# Patient Record
Sex: FEMALE | Race: WHITE | NOT HISPANIC OR LATINO | Employment: OTHER | URBAN - METROPOLITAN AREA
[De-identification: names, ages, dates, MRNs, and addresses within clinical notes are randomized per-mention and may not be internally consistent; named-entity substitution may affect disease eponyms.]

---

## 2023-09-07 ENCOUNTER — HOSPITAL ENCOUNTER (OUTPATIENT)
Dept: DATA CONVERSION | Facility: HOSPITAL | Age: 68
Discharge: HOME | End: 2023-09-07
Payer: MEDICARE

## 2023-09-07 DIAGNOSIS — Z00.00 ENCOUNTER FOR GENERAL ADULT MEDICAL EXAMINATION WITHOUT ABNORMAL FINDINGS: ICD-10-CM

## 2023-09-07 DIAGNOSIS — E55.9 VITAMIN D DEFICIENCY, UNSPECIFIED: ICD-10-CM

## 2023-09-07 DIAGNOSIS — E78.5 HYPERLIPIDEMIA, UNSPECIFIED: ICD-10-CM

## 2023-09-07 LAB
25(OH)D3 SERPL-MCNC: 47 NG/ML (ref 31–100)
ALBUMIN SERPL-MCNC: 4.8 GM/DL (ref 3.5–5)
ALBUMIN/GLOB SERPL: 1.8 RATIO (ref 1.5–3)
ALP BLD-CCNC: 66 U/L (ref 35–125)
ALT SERPL-CCNC: 22 U/L (ref 5–40)
ANION GAP SERPL CALCULATED.3IONS-SCNC: 13 MMOL/L (ref 0–19)
APPEARANCE PLAS: ABNORMAL
AST SERPL-CCNC: 27 U/L (ref 5–40)
BASOPHILS # BLD AUTO: 0.04 K/UL (ref 0–0.22)
BASOPHILS NFR BLD AUTO: 0.7 % (ref 0–1)
BILIRUB SERPL-MCNC: 0.6 MG/DL (ref 0.1–1.2)
BUN SERPL-MCNC: 16 MG/DL (ref 8–25)
BUN/CREAT SERPL: 26.7 RATIO (ref 8–21)
CALCIUM SERPL-MCNC: 9.7 MG/DL (ref 8.5–10.4)
CHLORIDE SERPL-SCNC: 103 MMOL/L (ref 97–107)
CHOLEST SERPL-MCNC: 227 MG/DL (ref 133–200)
CHOLEST/HDLC SERPL: 3.2 RATIO
CO2 SERPL-SCNC: 27 MMOL/L (ref 24–31)
COLOR SPUN FLD: YELLOW
CREAT SERPL-MCNC: 0.6 MG/DL (ref 0.4–1.6)
DEPRECATED RDW RBC AUTO: 44.7 FL (ref 37–54)
DIFFERENTIAL METHOD BLD: ABNORMAL
EOSINOPHIL # BLD AUTO: 0.02 K/UL (ref 0–0.45)
EOSINOPHIL NFR BLD: 0.3 % (ref 0–3)
ERYTHROCYTE [DISTWIDTH] IN BLOOD BY AUTOMATED COUNT: 12.3 % (ref 11.7–15)
FASTING STATUS PATIENT QL REPORTED: ABNORMAL
GFR SERPL CREATININE-BSD FRML MDRD: 98 ML/MIN/1.73 M2
GLOBULIN SER-MCNC: 2.6 G/DL (ref 1.9–3.7)
GLUCOSE SERPL-MCNC: 99 MG/DL (ref 65–99)
HCT VFR BLD AUTO: 42.4 % (ref 36–44)
HDLC SERPL-MCNC: 72 MG/DL
HGB BLD-MCNC: 14 GM/DL (ref 12–15)
IMM GRANULOCYTES # BLD AUTO: 0.02 K/UL (ref 0–0.1)
LDLC SERPL CALC-MCNC: 127 MG/DL (ref 65–130)
LYMPHOCYTES # BLD AUTO: 1.86 K/UL (ref 1.2–3.2)
LYMPHOCYTES NFR BLD MANUAL: 31.7 % (ref 20–40)
MCH RBC QN AUTO: 32.3 PG (ref 26–34)
MCHC RBC AUTO-ENTMCNC: 33 % (ref 31–37)
MCV RBC AUTO: 97.9 FL (ref 80–100)
MONOCYTES # BLD AUTO: 0.62 K/UL (ref 0–0.8)
MONOCYTES NFR BLD MANUAL: 10.6 % (ref 0–8)
NEUTROPHILS # BLD AUTO: 3.3 K/UL
NEUTROPHILS # BLD AUTO: 3.3 K/UL (ref 1.8–7.7)
NEUTROPHILS.IMMATURE NFR BLD: 0.3 % (ref 0–1)
NEUTS SEG NFR BLD: 56.4 % (ref 50–70)
NRBC BLD-RTO: 0 /100 WBC
PLATELET # BLD AUTO: 310 K/UL (ref 150–450)
PMV BLD AUTO: 10.3 CU (ref 7–12.6)
POTASSIUM SERPL-SCNC: 4.5 MMOL/L (ref 3.4–5.1)
PROT SERPL-MCNC: 7.4 G/DL (ref 5.9–7.9)
RBC # BLD AUTO: 4.33 M/UL (ref 4–4.9)
SODIUM SERPL-SCNC: 143 MMOL/L (ref 133–145)
TRIGL SERPL-MCNC: 138 MG/DL (ref 40–150)
TSH SERPL DL<=0.05 MIU/L-ACNC: 1.1 MIU/L (ref 0.27–4.2)
WBC # BLD AUTO: 5.9 K/UL (ref 4.5–11)

## 2023-11-20 DIAGNOSIS — E78.5 HYPERLIPIDEMIA, UNSPECIFIED HYPERLIPIDEMIA TYPE: ICD-10-CM

## 2023-11-20 DIAGNOSIS — F41.9 ANXIETY: ICD-10-CM

## 2023-11-20 RX ORDER — CITALOPRAM 10 MG/1
10 TABLET ORAL DAILY
Qty: 90 TABLET | Refills: 3 | Status: SHIPPED | OUTPATIENT
Start: 2023-11-20

## 2023-11-20 RX ORDER — ATORVASTATIN CALCIUM 40 MG/1
TABLET, FILM COATED ORAL
COMMUNITY
End: 2024-02-13 | Stop reason: SDUPTHER

## 2023-11-20 RX ORDER — ATORVASTATIN CALCIUM 80 MG/1
TABLET, FILM COATED ORAL EVERY 24 HOURS
COMMUNITY
Start: 2018-10-22 | End: 2023-11-20 | Stop reason: SDUPTHER

## 2023-11-20 RX ORDER — ATORVASTATIN CALCIUM 80 MG/1
80 TABLET, FILM COATED ORAL EVERY 24 HOURS
Qty: 90 TABLET | Refills: 3 | Status: SHIPPED | OUTPATIENT
Start: 2023-11-20 | End: 2024-11-19

## 2023-12-26 DIAGNOSIS — R06.02 SHORTNESS OF BREATH: Primary | ICD-10-CM

## 2023-12-26 PROBLEM — M75.121 COMPLETE TEAR OF RIGHT ROTATOR CUFF: Status: ACTIVE | Noted: 2023-12-26

## 2023-12-26 PROBLEM — K21.9 GASTROESOPHAGEAL REFLUX DISEASE: Status: ACTIVE | Noted: 2023-12-26

## 2023-12-26 PROBLEM — M75.81 TENDINITIS OF RIGHT ROTATOR CUFF: Status: ACTIVE | Noted: 2023-12-26

## 2023-12-26 PROBLEM — M50.90 CERVICAL DISC DISORDER: Status: ACTIVE | Noted: 2023-12-26

## 2023-12-26 PROBLEM — H90.3 BILATERAL SENSORINEURAL HEARING LOSS: Status: ACTIVE | Noted: 2023-12-26

## 2023-12-26 PROBLEM — S46.011A STRAIN OF RIGHT ROTATOR CUFF CAPSULE: Status: ACTIVE | Noted: 2023-12-26

## 2023-12-26 PROBLEM — M19.011 OSTEOARTHRITIS OF RIGHT GLENOHUMERAL JOINT: Status: ACTIVE | Noted: 2023-12-26

## 2023-12-26 PROBLEM — F41.9 ANXIETY: Status: ACTIVE | Noted: 2023-12-26

## 2023-12-26 PROBLEM — M75.111 NONTRAUMATIC INCOMPLETE TEAR OF RIGHT ROTATOR CUFF: Status: ACTIVE | Noted: 2023-12-26

## 2023-12-26 PROBLEM — I20.9 ANGINA PECTORIS (CMS-HCC): Status: ACTIVE | Noted: 2023-12-26

## 2023-12-26 PROBLEM — E55.9 VITAMIN D DEFICIENCY: Status: ACTIVE | Noted: 2023-12-26

## 2023-12-26 PROBLEM — E78.00 PURE HYPERCHOLESTEROLEMIA: Status: ACTIVE | Noted: 2023-12-26

## 2023-12-26 PROBLEM — E78.5 HYPERLIPIDEMIA: Status: ACTIVE | Noted: 2023-12-26

## 2023-12-26 PROBLEM — R03.0 FINDING OF ABOVE NORMAL BLOOD PRESSURE: Status: ACTIVE | Noted: 2023-12-26

## 2023-12-26 PROBLEM — M75.42 IMPINGEMENT SYNDROME OF LEFT SHOULDER REGION: Status: ACTIVE | Noted: 2023-12-26

## 2023-12-26 PROBLEM — G56.21 CUBITAL TUNNEL SYNDROME ON RIGHT: Status: ACTIVE | Noted: 2023-12-26

## 2023-12-26 PROBLEM — M77.8 TENDINITIS OF LEFT SHOULDER: Status: ACTIVE | Noted: 2023-12-26

## 2023-12-26 PROBLEM — H93.13 BILATERAL TINNITUS: Status: ACTIVE | Noted: 2023-12-26

## 2023-12-26 PROBLEM — R32 INCONTINENCE: Status: ACTIVE | Noted: 2023-12-26

## 2023-12-26 PROBLEM — M65.911 SYNOVITIS OF RIGHT SHOULDER: Status: ACTIVE | Noted: 2023-12-26

## 2023-12-26 PROBLEM — S46.219A BICEPS TENDON TEAR: Status: ACTIVE | Noted: 2023-12-26

## 2023-12-26 PROBLEM — S46.919A SHOULDER STRAIN: Status: ACTIVE | Noted: 2023-12-26

## 2023-12-26 PROBLEM — M75.41 IMPINGEMENT SYNDROME OF RIGHT SHOULDER REGION: Status: ACTIVE | Noted: 2023-12-26

## 2023-12-26 PROBLEM — M75.51 BURSITIS OF RIGHT SHOULDER: Status: ACTIVE | Noted: 2023-12-26

## 2023-12-26 PROBLEM — M95.8 WINGED SCAPULA: Status: ACTIVE | Noted: 2023-12-26

## 2023-12-26 PROBLEM — M65.811 SYNOVITIS OF RIGHT SHOULDER: Status: ACTIVE | Noted: 2023-12-26

## 2023-12-26 PROBLEM — S43.499A SPRAIN OF POSTERIOR SHOULDER JOINT: Status: ACTIVE | Noted: 2023-12-26

## 2023-12-26 PROBLEM — F43.9 STRESS AT HOME: Status: ACTIVE | Noted: 2023-12-26

## 2023-12-26 PROBLEM — R42 DIZZINESSES: Status: ACTIVE | Noted: 2023-12-26

## 2023-12-26 PROBLEM — M25.511 PAIN IN JOINT OF RIGHT SHOULDER: Status: ACTIVE | Noted: 2023-12-26

## 2023-12-26 RX ORDER — NADOLOL 20 MG/1
20 TABLET ORAL 2 TIMES DAILY
Qty: 180 TABLET | Refills: 1 | Status: SHIPPED | OUTPATIENT
Start: 2023-12-26 | End: 2024-02-13 | Stop reason: SDUPTHER

## 2023-12-26 RX ORDER — CYCLOBENZAPRINE HCL 10 MG
10 TABLET ORAL 3 TIMES DAILY PRN
COMMUNITY
Start: 2023-03-22 | End: 2024-05-09 | Stop reason: ALTCHOICE

## 2023-12-26 RX ORDER — OXYCODONE AND ACETAMINOPHEN 5; 325 MG/1; MG/1
1 TABLET ORAL EVERY 6 HOURS PRN
COMMUNITY
Start: 2019-09-20

## 2023-12-26 RX ORDER — DICLOFENAC SODIUM 10 MG/G
4 GEL TOPICAL 4 TIMES DAILY PRN
COMMUNITY
Start: 2023-03-22 | End: 2024-05-09 | Stop reason: ALTCHOICE

## 2023-12-26 RX ORDER — NITROGLYCERIN 0.4 MG/1
0.4 TABLET SUBLINGUAL EVERY 5 MIN PRN
COMMUNITY
Start: 2020-03-16 | End: 2024-02-13 | Stop reason: SDUPTHER

## 2023-12-26 RX ORDER — NIRMATRELVIR AND RITONAVIR 300-100 MG
3 KIT ORAL 2 TIMES DAILY
COMMUNITY
Start: 2023-09-30 | End: 2024-05-09 | Stop reason: ALTCHOICE

## 2023-12-26 RX ORDER — TRIAMCINOLONE ACETONIDE 1 MG/G
1 CREAM TOPICAL EVERY 12 HOURS
COMMUNITY
Start: 2023-03-22 | End: 2024-05-09 | Stop reason: ALTCHOICE

## 2023-12-26 RX ORDER — MECLIZINE HYDROCHLORIDE 25 MG/1
25 TABLET ORAL 3 TIMES DAILY PRN
COMMUNITY
Start: 2021-05-29 | End: 2024-05-09 | Stop reason: ALTCHOICE

## 2023-12-26 RX ORDER — CIPROFLOXACIN 500 MG/1
500 TABLET ORAL 2 TIMES DAILY
COMMUNITY
Start: 2023-03-09 | End: 2024-05-09 | Stop reason: ALTCHOICE

## 2023-12-26 RX ORDER — METHYLPREDNISOLONE 4 MG/1
4 TABLET ORAL SEE ADMIN INSTRUCTIONS
COMMUNITY
Start: 2023-03-22 | End: 2024-05-09 | Stop reason: ALTCHOICE

## 2023-12-26 RX ORDER — OMEPRAZOLE 40 MG/1
1 CAPSULE, DELAYED RELEASE ORAL
COMMUNITY
Start: 2020-11-23 | End: 2024-01-22

## 2023-12-26 RX ORDER — CALCIUM CARBONATE 500(1250)
1 TABLET ORAL
COMMUNITY
End: 2024-02-13 | Stop reason: SDUPTHER

## 2023-12-26 RX ORDER — NADOLOL 20 MG/1
20 TABLET ORAL 2 TIMES DAILY PRN
COMMUNITY
End: 2023-12-26 | Stop reason: SDUPTHER

## 2023-12-26 RX ORDER — DICLOFENAC SODIUM 50 MG/1
50 TABLET, DELAYED RELEASE ORAL 2 TIMES DAILY
COMMUNITY
Start: 2022-06-21 | End: 2024-05-09 | Stop reason: ALTCHOICE

## 2023-12-26 RX ORDER — MELOXICAM 15 MG/1
15 TABLET ORAL DAILY
COMMUNITY
Start: 2021-05-27 | End: 2024-05-09 | Stop reason: ALTCHOICE

## 2023-12-26 RX ORDER — CITALOPRAM 10 MG/1
10 TABLET ORAL DAILY
COMMUNITY
End: 2024-02-13 | Stop reason: SDUPTHER

## 2023-12-26 RX ORDER — BUPROPION HCL 300 MG
300 TABLET, EXTENDED RELEASE 24 HR ORAL EVERY MORNING
COMMUNITY
Start: 2019-11-19 | End: 2024-06-04

## 2024-01-20 DIAGNOSIS — K21.9 GASTROESOPHAGEAL REFLUX DISEASE WITHOUT ESOPHAGITIS: ICD-10-CM

## 2024-01-22 RX ORDER — OMEPRAZOLE 40 MG/1
CAPSULE, DELAYED RELEASE ORAL
Qty: 90 CAPSULE | Refills: 2 | Status: SHIPPED | OUTPATIENT
Start: 2024-01-22

## 2024-02-13 ENCOUNTER — OFFICE VISIT (OUTPATIENT)
Dept: CARDIOLOGY | Facility: CLINIC | Age: 69
End: 2024-02-13
Payer: MEDICARE

## 2024-02-13 VITALS
HEIGHT: 65 IN | BODY MASS INDEX: 24.66 KG/M2 | DIASTOLIC BLOOD PRESSURE: 81 MMHG | RESPIRATION RATE: 18 BRPM | HEART RATE: 64 BPM | TEMPERATURE: 98.9 F | SYSTOLIC BLOOD PRESSURE: 115 MMHG | WEIGHT: 148 LBS | OXYGEN SATURATION: 97 %

## 2024-02-13 DIAGNOSIS — E78.2 MIXED HYPERLIPIDEMIA: Primary | ICD-10-CM

## 2024-02-13 DIAGNOSIS — R06.02 SHORTNESS OF BREATH: ICD-10-CM

## 2024-02-13 DIAGNOSIS — R00.2 PALPITATION: ICD-10-CM

## 2024-02-13 DIAGNOSIS — I20.9 ANGINA PECTORIS (CMS-HCC): ICD-10-CM

## 2024-02-13 PROBLEM — R91.1 SOLITARY PULMONARY NODULE: Status: ACTIVE | Noted: 2024-02-13

## 2024-02-13 PROBLEM — M93.90 OSTEOCHONDROPATHY: Status: ACTIVE | Noted: 2024-02-13

## 2024-02-13 PROBLEM — M81.0 AGE-RELATED OSTEOPOROSIS WITHOUT CURRENT PATHOLOGICAL FRACTURE: Status: ACTIVE | Noted: 2024-02-13

## 2024-02-13 PROCEDURE — 1159F MED LIST DOCD IN RCRD: CPT | Performed by: INTERNAL MEDICINE

## 2024-02-13 PROCEDURE — 1126F AMNT PAIN NOTED NONE PRSNT: CPT | Performed by: INTERNAL MEDICINE

## 2024-02-13 PROCEDURE — 99213 OFFICE O/P EST LOW 20 MIN: CPT | Performed by: INTERNAL MEDICINE

## 2024-02-13 PROCEDURE — 1036F TOBACCO NON-USER: CPT | Performed by: INTERNAL MEDICINE

## 2024-02-13 RX ORDER — ROSUVASTATIN CALCIUM 10 MG/1
10 TABLET, COATED ORAL DAILY
COMMUNITY
End: 2024-05-09 | Stop reason: ALTCHOICE

## 2024-02-13 RX ORDER — MULTIVIT-MIN/IRON FUM/FOLIC AC 7.5 MG-4
1 TABLET ORAL DAILY
COMMUNITY
End: 2024-02-13 | Stop reason: SDUPTHER

## 2024-02-13 RX ORDER — NITROGLYCERIN 0.4 MG/1
0.4 TABLET SUBLINGUAL EVERY 5 MIN PRN
Qty: 90 TABLET | Refills: 3 | Status: SHIPPED | OUTPATIENT
Start: 2024-02-13 | End: 2024-05-09 | Stop reason: WASHOUT

## 2024-02-13 RX ORDER — CALCIUM CARBONATE/VITAMIN D3 600MG-5MCG
1 TABLET ORAL DAILY
COMMUNITY

## 2024-02-13 RX ORDER — IBUPROFEN 600 MG/1
1 TABLET ORAL EVERY 8 HOURS PRN
COMMUNITY
Start: 2013-02-24

## 2024-02-13 RX ORDER — NADOLOL 20 MG/1
20 TABLET ORAL 2 TIMES DAILY
Qty: 180 TABLET | Refills: 3 | Status: SHIPPED | OUTPATIENT
Start: 2024-02-13 | End: 2025-02-07

## 2024-02-13 RX ORDER — ACETAMINOPHEN 500 MG
2000 TABLET ORAL DAILY
COMMUNITY
End: 2024-05-09 | Stop reason: ALTCHOICE

## 2024-02-13 ASSESSMENT — PATIENT HEALTH QUESTIONNAIRE - PHQ9
2. FEELING DOWN, DEPRESSED OR HOPELESS: NOT AT ALL
1. LITTLE INTEREST OR PLEASURE IN DOING THINGS: NOT AT ALL
SUM OF ALL RESPONSES TO PHQ9 QUESTIONS 1 AND 2: 0

## 2024-02-13 ASSESSMENT — ENCOUNTER SYMPTOMS
LOSS OF SENSATION IN FEET: 0
DEPRESSION: 0
OCCASIONAL FEELINGS OF UNSTEADINESS: 0

## 2024-02-13 ASSESSMENT — PAIN SCALES - GENERAL: PAINLEVEL: 0-NO PAIN

## 2024-02-13 NOTE — PROGRESS NOTES
Subjective   Chief Complaint   Patient presents with    Follow-up     Mrs\Ms. Gutierrez is present for her 6 month Follow up with Dr. Little         68-year-old female patient with a history of hypertension hyperlipidemia.  Patient was last seen July last year essentially history of foot surgery in past as well as a right rotator cuff surgery in the past.  Family history of heart disease and hypertension.  Currently on Lipitor 80 mg daily, Wellbutrin, Celexa and nadolol 20 mg tablet p.o. twice a day.  History of palpitation fluttering.  Stable cardiac wise me at the moment.  She had a lipid profile checked in September 2023 essentially with total cholesterol 227 LDL is 127 mg deciliter.  Triglycerides 138 mg deciliter.  Patient had a comprehensive metabolic panel done including LFTs essentially unremarkable.      Past Medical History:   Diagnosis Date    Personal history of diseases of the blood and blood-forming organs and certain disorders involving the immune mechanism     History of autoimmune disorder     Past Surgical History:   Procedure Laterality Date    OTHER SURGICAL HISTORY  06/10/2021    Hysterectomy     No relevant family history has been documented for this patient.  Current Outpatient Medications   Medication Sig Dispense Refill    atorvastatin (Lipitor) 80 mg tablet Take 1 tablet (80 mg) by mouth once every 24 hours. 90 tablet 3    calcium carbonate-vitamin D3 600 mg-5 mcg (200 unit) tablet Take 1 tablet by mouth once daily.      cholecalciferol (Vitamin D-3) 50 mcg (2,000 unit) capsule Take 1 capsule (50 mcg) by mouth early in the morning..      citalopram (CeleXA) 10 mg tablet Take 1 tablet (10 mg) by mouth once daily. 90 tablet 3    diclofenac (Voltaren) 50 mg EC tablet Take 1 tablet (50 mg) by mouth 2 times a day. 1 tablet as needed Orally Twice a day with food for 14 days      diclofenac sodium (Voltaren) 1 % gel gel Apply 4.5 inches (4 g) topically 4 times a day as needed.      ibuprofen (IBU)  600 mg tablet Take 1 tablet (600 mg) by mouth every 8 hours if needed for moderate pain (4 - 6).      multivit with minerals/lutein (MULTIVITAMIN 50 PLUS ORAL) Take 1 tablet by mouth once daily.      omeprazole (PriLOSEC) 40 mg DR capsule 1 capsule 30 minutes before morning meal Orally Once a day 90 days 90 capsule 2    triamcinolone (Kenalog) 0.1 % cream Apply 1 Application topically every 12 hours.      Wellbutrin  mg 24 hr tablet Take 1 tablet (300 mg) by mouth once daily in the morning.      ciprofloxacin (Cipro) 500 mg tablet Take 1 tablet (500 mg) by mouth 2 times a day.      cyclobenzaprine (Flexeril) 10 mg tablet Take 1 tablet (10 mg) by mouth 3 times a day as needed for muscle spasms.      meclizine (Antivert) 25 mg tablet Take 1 tablet (25 mg) by mouth 3 times a day as needed for nausea or dizziness.      Medrol, Eduardo, 4 mg tablets Take 1 tablet (4 mg) by mouth see administration instructions. as directed Orally as directed for 6 days      meloxicam (Mobic) 15 mg tablet Take 1 tablet (15 mg) by mouth once daily.      nadolol (Corgard) 20 mg tablet Take 1 tablet (20 mg) by mouth 2 times a day. 180 tablet 3    nitroglycerin (Nitrostat) 0.4 mg SL tablet Place 1 tablet (0.4 mg) under the tongue every 5 minutes if needed for chest pain. 90 tablet 3    oxyCODONE-acetaminophen (Percocet) 5-325 mg tablet Take 1 tablet by mouth every 6 hours if needed for severe pain (7 - 10).      Paxlovid 300 mg (150 mg x 2)-100 mg tablet therapy pack Take 3 tablets by mouth 2 times a day. TAKE 3 TABLETS BY MOUTH TWICE A DAY FOR 5 DAYS PER PACKAGE INSTRUCTION      rosuvastatin (Crestor) 10 mg tablet Take 1 tablet (10 mg) by mouth once daily.       No current facility-administered medications for this visit.      reports that she has never smoked. She has never been exposed to tobacco smoke. She has never used smokeless tobacco. She reports that she does not currently use alcohol. She reports that she does not use  "drugs.  Sulfa (sulfonamide antibiotics), Codeine, and Morphine  Mixed hyperlipidemia    Vitals:    02/13/24 0948   BP: 115/81   Pulse: 64   Resp: 18   Temp: 37.2 °C (98.9 °F)   TempSrc: Core   SpO2: 97%   Weight: 67.1 kg (148 lb)   Height: 1.651 m (5' 5\")   PainSc: 0-No pain      BMI:Body mass index is 24.63 kg/m².   General Cardiology:  General Appearance: Alert, oriented and in no acute distress.  HEENT: extra ocular movements intact (EOMI), pupils equal,  round, reactive to light and accommodation (PERRLA).  Carotid Upstroke: no bruit, normal.  Jugular Venous Distention (JVD): flat.  Chest: normal.  Lungs: Clear to auscultation,   Heart Sounds: no S3 or S4, normal S1, S2, regular rate.  Murmur, Click, Gallop: no systolic murmur.  Abdomen: no hepatomegaly, no masses felt, soft.  Extremities: no leg edema.  Peripheral pulses: 2 plus bilateral.  NEUROLOGY Cranial nerves II-XII grossly intact.     Patient Active Problem List   Diagnosis    Angina pectoris (CMS/HCC)    Anxiety    Biceps tendon tear    Bilateral sensorineural hearing loss    Bilateral tinnitus    Bursitis of right shoulder    Cervical disc disorder    Complete tear of right rotator cuff    Cubital tunnel syndrome on right    Finding of above normal blood pressure    Gastroesophageal reflux disease    Hyperlipidemia    Impingement syndrome of left shoulder region    Impingement syndrome of right shoulder region    Incontinence    Nontraumatic incomplete tear of right rotator cuff    Osteoarthritis of right glenohumeral joint    Pain in joint of right shoulder    Pure hypercholesterolemia    Shortness of breath    Shoulder strain    Sprain of posterior shoulder joint    Strain of right rotator cuff capsule    Synovitis of right shoulder    Stress at home    Tendinitis of left shoulder    Tendinitis of right rotator cuff    Winged scapula    Vitamin D deficiency    Dizzinesses    Solitary pulmonary nodule    Osteochondropathy    Age-related osteoporosis " without current pathological fracture    Palpitation       Problem List Items Addressed This Visit          Cardiac and Vasculature    Angina pectoris (CMS/HCC)    Relevant Medications    nitroglycerin (Nitrostat) 0.4 mg SL tablet    nadolol (Corgard) 20 mg tablet    Hyperlipidemia - Primary    Palpitation       Pulmonary and Pneumonias    Shortness of breath    Relevant Medications    nadolol (Corgard) 20 mg tablet      68-year-old female patient history of palpitation fluttering hyperlipidemia so far stable cardiac wise continue current nadolol and Lipitor.  No active angina or CHF signs symptoms.  Modify risk factor.  Stable cardioprotective.  She has upcoming calcium scoring done.  Stable cardiac wise.  Modify risk factor.  Diet and exercise reviewed with patient..advice to walk about 10,000 steps or about 2 hours during day time. Cut back on salt, sugar and flour.    Richard Little MD

## 2024-02-15 ENCOUNTER — HOSPITAL ENCOUNTER (OUTPATIENT)
Dept: RADIOLOGY | Facility: HOSPITAL | Age: 69
Discharge: HOME | End: 2024-02-15
Payer: MEDICARE

## 2024-02-15 VITALS — BODY MASS INDEX: 24.49 KG/M2 | HEIGHT: 65 IN | WEIGHT: 147 LBS

## 2024-02-15 DIAGNOSIS — Z12.31 ENCOUNTER FOR SCREENING MAMMOGRAM FOR MALIGNANT NEOPLASM OF BREAST: ICD-10-CM

## 2024-02-15 DIAGNOSIS — N95.9 UNSPECIFIED MENOPAUSAL AND PERIMENOPAUSAL DISORDER: ICD-10-CM

## 2024-02-15 PROCEDURE — 77085 DXA BONE DENSITY AXL VRT FX: CPT

## 2024-02-15 PROCEDURE — 77067 SCR MAMMO BI INCL CAD: CPT

## 2024-04-12 ENCOUNTER — HOSPITAL ENCOUNTER (OUTPATIENT)
Dept: RADIOLOGY | Facility: HOSPITAL | Age: 69
Discharge: HOME | End: 2024-04-12
Payer: MEDICARE

## 2024-04-12 DIAGNOSIS — Z87.891 PERSONAL HISTORY OF NICOTINE DEPENDENCE: ICD-10-CM

## 2024-04-12 DIAGNOSIS — I25.10 ATHEROSCLEROTIC HEART DISEASE OF NATIVE CORONARY ARTERY WITHOUT ANGINA PECTORIS: ICD-10-CM

## 2024-04-12 PROCEDURE — 75571 CT HRT W/O DYE W/CA TEST: CPT

## 2024-04-12 PROCEDURE — 71271 CT THORAX LUNG CANCER SCR C-: CPT

## 2024-04-29 PROBLEM — H18.593 OTHER HEREDITARY CORNEAL DYSTROPHIES, BILATERAL: Status: RESOLVED | Noted: 2024-04-29 | Resolved: 2024-04-29

## 2024-04-29 PROBLEM — Z86.2 HISTORY OF IMMUNE DISORDER: Status: RESOLVED | Noted: 2024-04-29 | Resolved: 2024-04-29

## 2024-04-29 PROBLEM — H18.519 FUCHS' CORNEAL DYSTROPHY: Status: RESOLVED | Noted: 2024-04-29 | Resolved: 2024-04-29

## 2024-04-29 PROBLEM — H25.10 NUCLEAR SENILE CATARACT: Status: RESOLVED | Noted: 2024-04-29 | Resolved: 2024-04-29

## 2024-04-29 PROBLEM — S43.439A SUPERIOR GLENOID LABRUM LESION OF SHOULDER: Status: RESOLVED | Noted: 2023-12-26 | Resolved: 2024-04-29

## 2024-05-09 ENCOUNTER — OFFICE VISIT (OUTPATIENT)
Dept: PULMONOLOGY | Facility: CLINIC | Age: 69
End: 2024-05-09
Payer: MEDICARE

## 2024-05-09 VITALS
OXYGEN SATURATION: 96 % | BODY MASS INDEX: 24.33 KG/M2 | WEIGHT: 146.2 LBS | SYSTOLIC BLOOD PRESSURE: 133 MMHG | HEART RATE: 65 BPM | DIASTOLIC BLOOD PRESSURE: 86 MMHG

## 2024-05-09 DIAGNOSIS — F17.211 CIGARETTE NICOTINE DEPENDENCE IN REMISSION: Primary | ICD-10-CM

## 2024-05-09 DIAGNOSIS — R91.1 LUNG NODULE: ICD-10-CM

## 2024-05-09 DIAGNOSIS — J44.9 CHRONIC OBSTRUCTIVE PULMONARY DISEASE, UNSPECIFIED COPD TYPE (MULTI): ICD-10-CM

## 2024-05-09 PROCEDURE — 1159F MED LIST DOCD IN RCRD: CPT | Performed by: PEDIATRICS

## 2024-05-09 PROCEDURE — 99205 OFFICE O/P NEW HI 60 MIN: CPT | Performed by: PEDIATRICS

## 2024-05-09 PROCEDURE — 1036F TOBACCO NON-USER: CPT | Performed by: PEDIATRICS

## 2024-05-09 PROCEDURE — 1160F RVW MEDS BY RX/DR IN RCRD: CPT | Performed by: PEDIATRICS

## 2024-05-09 NOTE — PROGRESS NOTES
Subjective   Patient ID: Lorena Gutierrez is a 68 y.o. female who presents for  lung nodule    HPI    Ms Gutierrez is a 68yr olf former smoker that had a low dose screening CT which shows several small stable nodules and a new 13 x 7mm ground glass nodule in the JOSE.  She has no respiratory complaints or issues.    She smoked 1/2 - 1ppd from age [age] but not continuously, she had quit several times then would restart.    She has a significant family history of lung cancer (mother, father, siblings)    Review of Systems    See scanned documents attached to this note for review of systems, and appropriate scales/scores for this visit.     Objective   Physical Exam  Constitutional:       Appearance: Normal appearance.   HENT:      Head: Normocephalic and atraumatic.      Mouth/Throat:      Pharynx: Oropharynx is clear.   Cardiovascular:      Rate and Rhythm: Normal rate and regular rhythm.      Pulses: Normal pulses.      Heart sounds: Normal heart sounds.   Pulmonary:      Effort: Pulmonary effort is normal.      Breath sounds: Normal breath sounds. No wheezing, rhonchi or rales.   Abdominal:      General: Bowel sounds are normal.      Palpations: Abdomen is soft.   Musculoskeletal:         General: Normal range of motion.   Skin:     General: Skin is warm and dry.   Neurological:      General: No focal deficit present.      Mental Status: She is alert and oriented to person, place, and time.   Psychiatric:         Mood and Affect: Mood normal.       Assessment/Plan     68yr old with new ground glass nodule found on screening CT    Lung nodule:  ground glass, looks inflammatory.  She was not ill at the time of the scan.  And she does have a smoking history as well as significant family history of lung cancer (in smokers)  - will get L3/L4 in 3 months (mid July)  - prednisone 40mg daily x 5 days and z-pack 1 week prior to repeat CT    2. Emphysema:  mild emphysema seen o CT.    No symptoms, no treatment at this  time  Could consider LAMA/LABA if symptoms develop    3. Tobacco use: quit 11 years ago, significant history of lung cancer.  42yrs 1/2 - 1ppd (about 30pack years)  Qualifies for LDCT screening program.    Follow up after CT       Remi Hassan MD 05/09/24 8:40 AM

## 2024-05-09 NOTE — LETTER
May 9, 2024     Laureano Olson DO  [address] [place] [id number]    Patient: Lorena Gutierrez   YOB: 1955   Date of Visit: 5/9/2024       Dear Dr. Laureano Olson DO:    Thank you for referring Lorena Gutierrez to me for evaluation. Below are my notes for this consultation.  If you have questions, please do not hesitate to call me. I look forward to following your patient along with you.       Sincerely,     Remi Hassan MD      CC: No Recipients  ______________________________________________________________________________________    Subjective  Patient ID: Lorena Gutierrez is a 68 y.o. female who presents for  lung nodule    HPI    Ms Gutierrez is a 68yr olf former smoker that had a low dose screening CT which shows several small stable nodules and a new 13 x 7mm ground glass nodule in the JOSE.  She has no respiratory complaints or issues.    She smoked 1/2 - 1ppd from age [age] but not continuously, she had quit several times then would restart.    She has a significant family history of lung cancer (mother, father, siblings)    Review of Systems    See scanned documents attached to this note for review of systems, and appropriate scales/scores for this visit.     Objective  Physical Exam  Constitutional:       Appearance: Normal appearance.   HENT:      Head: Normocephalic and atraumatic.      Mouth/Throat:      Pharynx: Oropharynx is clear.   Cardiovascular:      Rate and Rhythm: Normal rate and regular rhythm.      Pulses: Normal pulses.      Heart sounds: Normal heart sounds.   Pulmonary:      Effort: Pulmonary effort is normal.      Breath sounds: Normal breath sounds. No wheezing, rhonchi or rales.   Abdominal:      General: Bowel sounds are normal.      Palpations: Abdomen is soft.   Musculoskeletal:         General: Normal range of motion.   Skin:     General: Skin is warm and dry.   Neurological:      General: No focal deficit present.      Mental Status: She is alert  and oriented to person, place, and time.   Psychiatric:         Mood and Affect: Mood normal.       Assessment/Plan    68yr old with new ground glass nodule found on screening CT    Lung nodule:  ground glass, looks inflammatory.  She was not ill at the time of the scan.  And she does have a smoking history as well as significant family history of lung cancer (in smokers)  - will get L3/L4 in 3 months (mid July)  - prednisone 40mg daily x 5 days and z-pack 1 week prior to repeat CT    2. Emphysema:  mild emphysema seen o CT.    No symptoms, no treatment at this time  Could consider LAMA/LABA if symptoms develop    3. Tobacco use: quit 11 years ago, significant history of lung cancer.  42yrs 1/2 - 1ppd (about 30pack years)  Qualifies for LDCT screening program.    Follow up after CT       Remi Hassan MD 05/09/24 8:40 AM

## 2024-06-04 DIAGNOSIS — F41.9 ANXIETY: ICD-10-CM

## 2024-06-04 RX ORDER — BUPROPION HYDROCHLORIDE 300 MG/1
300 TABLET ORAL EVERY MORNING
Qty: 90 TABLET | Refills: 1 | Status: SHIPPED | OUTPATIENT
Start: 2024-06-04

## 2024-06-11 ENCOUNTER — APPOINTMENT (OUTPATIENT)
Dept: PULMONOLOGY | Facility: CLINIC | Age: 69
End: 2024-06-11
Payer: MEDICARE

## 2024-07-10 ENCOUNTER — HOSPITAL ENCOUNTER (OUTPATIENT)
Dept: RADIOLOGY | Facility: HOSPITAL | Age: 69
Discharge: HOME | End: 2024-07-10
Payer: MEDICARE

## 2024-07-10 DIAGNOSIS — F17.211 CIGARETTE NICOTINE DEPENDENCE IN REMISSION: ICD-10-CM

## 2024-07-10 PROCEDURE — 71250 CT THORAX DX C-: CPT

## 2024-07-10 PROCEDURE — 71250 CT THORAX DX C-: CPT | Performed by: RADIOLOGY

## 2024-07-31 ENCOUNTER — APPOINTMENT (OUTPATIENT)
Dept: PULMONOLOGY | Facility: CLINIC | Age: 69
End: 2024-07-31
Payer: MEDICARE

## 2024-08-05 ENCOUNTER — APPOINTMENT (OUTPATIENT)
Dept: PULMONOLOGY | Facility: CLINIC | Age: 69
End: 2024-08-05
Payer: MEDICARE

## 2024-08-05 VITALS
DIASTOLIC BLOOD PRESSURE: 85 MMHG | OXYGEN SATURATION: 92 % | WEIGHT: 145 LBS | SYSTOLIC BLOOD PRESSURE: 132 MMHG | BODY MASS INDEX: 24.13 KG/M2 | HEART RATE: 66 BPM

## 2024-08-05 DIAGNOSIS — R91.1 LUNG NODULE: ICD-10-CM

## 2024-08-05 DIAGNOSIS — F17.211 CIGARETTE NICOTINE DEPENDENCE IN REMISSION: Primary | ICD-10-CM

## 2024-08-05 PROCEDURE — 1036F TOBACCO NON-USER: CPT | Performed by: PEDIATRICS

## 2024-08-05 PROCEDURE — 1159F MED LIST DOCD IN RCRD: CPT | Performed by: PEDIATRICS

## 2024-08-05 PROCEDURE — 99215 OFFICE O/P EST HI 40 MIN: CPT | Performed by: PEDIATRICS

## 2024-08-05 PROCEDURE — 1160F RVW MEDS BY RX/DR IN RCRD: CPT | Performed by: PEDIATRICS

## 2024-08-05 NOTE — PROGRESS NOTES
Subjective   Patient ID: Lorena Gutierrez is a 68 y.o. female who presents for       HPI      8/5/2024:  Ms Gutierrez is about the same.  She had a repeat CT which shows stability of the ground glass nodule as well as the other smaller nodules.  She has no respiratory complaints.    Initial visit 5/9/2024:  Ms Gutierrez is a 68yr olf former smoker that had a low dose screening CT which shows several small stable nodules and a new 13 x 7mm ground glass nodule in the JOSE.  She has no respiratory complaints or issues.     She smoked 1/2 - 1ppd from age [age] but not continuously, she had quit several times then would restart.     She has a significant family history of lung cancer (mother, father, siblings)    Review of Systems    See scanned documents attached to this note for review of systems, and appropriate scales/scores for this visit.     Objective   Physical Exam  Constitutional:       Appearance: Normal appearance.   HENT:      Head: Normocephalic and atraumatic.      Mouth/Throat:      Pharynx: Oropharynx is clear.   Cardiovascular:      Rate and Rhythm: Normal rate and regular rhythm.      Pulses: Normal pulses.      Heart sounds: Normal heart sounds.   Pulmonary:      Effort: Pulmonary effort is normal.      Breath sounds: Normal breath sounds. No wheezing, rhonchi or rales.   Abdominal:      General: Bowel sounds are normal.      Palpations: Abdomen is soft.   Musculoskeletal:         General: Normal range of motion.   Skin:     General: Skin is warm and dry.   Neurological:      General: No focal deficit present.      Mental Status: She is alert and oriented to person, place, and time.   Psychiatric:         Mood and Affect: Mood normal.       Assessment/Plan     68yr old with new ground glass nodule found on screening CT     Lung nodule:  ground glass, looks inflammatory.  She was not ill at the time of the scan.  And she does have a smoking history as well as significant family history of lung cancer  (in smokers). Repeat CT unchanged  - will get L3/L4 in 6 months (January)     2. Emphysema:  mild emphysema seen o CT.    No symptoms, no treatment at this time  -Could consider LAMA/LABA if symptoms develop     3. Tobacco use: quit 11 years ago, significant history of lung cancer.  42yrs 1/2 - 1ppd (about 30pack years)  Qualifies for LDCT screening program.     Follow up after CT January       Remi Hassan MD 08/05/24 9:54 AM

## 2024-08-27 ENCOUNTER — APPOINTMENT (OUTPATIENT)
Dept: CARDIOLOGY | Facility: CLINIC | Age: 69
End: 2024-08-27
Payer: MEDICARE

## 2024-08-28 DIAGNOSIS — E78.5 HYPERLIPIDEMIA, UNSPECIFIED HYPERLIPIDEMIA TYPE: ICD-10-CM

## 2024-08-29 RX ORDER — ATORVASTATIN CALCIUM 80 MG/1
80 TABLET, FILM COATED ORAL EVERY 24 HOURS
Qty: 90 TABLET | Refills: 3 | Status: SHIPPED | OUTPATIENT
Start: 2024-08-29 | End: 2025-08-29

## 2024-10-29 ENCOUNTER — APPOINTMENT (OUTPATIENT)
Dept: CARDIOLOGY | Facility: CLINIC | Age: 69
End: 2024-10-29
Payer: MEDICARE

## 2024-10-29 ENCOUNTER — OFFICE VISIT (OUTPATIENT)
Dept: CARDIOLOGY | Facility: CLINIC | Age: 69
End: 2024-10-29
Payer: MEDICARE

## 2024-10-29 VITALS
HEART RATE: 68 BPM | HEIGHT: 65 IN | DIASTOLIC BLOOD PRESSURE: 83 MMHG | BODY MASS INDEX: 24.16 KG/M2 | TEMPERATURE: 98.6 F | WEIGHT: 145 LBS | SYSTOLIC BLOOD PRESSURE: 122 MMHG | RESPIRATION RATE: 16 BRPM | OXYGEN SATURATION: 98 %

## 2024-10-29 DIAGNOSIS — E78.2 MIXED HYPERLIPIDEMIA: Primary | ICD-10-CM

## 2024-10-29 DIAGNOSIS — R00.2 PALPITATION: ICD-10-CM

## 2024-10-29 PROCEDURE — G2211 COMPLEX E/M VISIT ADD ON: HCPCS | Performed by: INTERNAL MEDICINE

## 2024-10-29 PROCEDURE — 1159F MED LIST DOCD IN RCRD: CPT | Performed by: INTERNAL MEDICINE

## 2024-10-29 PROCEDURE — 1160F RVW MEDS BY RX/DR IN RCRD: CPT | Performed by: INTERNAL MEDICINE

## 2024-10-29 PROCEDURE — 1036F TOBACCO NON-USER: CPT | Performed by: INTERNAL MEDICINE

## 2024-10-29 PROCEDURE — 3008F BODY MASS INDEX DOCD: CPT | Performed by: INTERNAL MEDICINE

## 2024-10-29 PROCEDURE — 1126F AMNT PAIN NOTED NONE PRSNT: CPT | Performed by: INTERNAL MEDICINE

## 2024-10-29 PROCEDURE — 99214 OFFICE O/P EST MOD 30 MIN: CPT | Performed by: INTERNAL MEDICINE

## 2024-10-29 RX ORDER — EZETIMIBE 10 MG/1
10 TABLET ORAL DAILY
COMMUNITY
Start: 2024-09-06

## 2024-10-29 ASSESSMENT — PATIENT HEALTH QUESTIONNAIRE - PHQ9
SUM OF ALL RESPONSES TO PHQ9 QUESTIONS 1 AND 2: 0
2. FEELING DOWN, DEPRESSED OR HOPELESS: NOT AT ALL
1. LITTLE INTEREST OR PLEASURE IN DOING THINGS: NOT AT ALL

## 2024-10-29 ASSESSMENT — PAIN SCALES - GENERAL: PAINLEVEL_OUTOF10: 0-NO PAIN

## 2024-10-29 ASSESSMENT — ENCOUNTER SYMPTOMS
LOSS OF SENSATION IN FEET: 0
DEPRESSION: 0
OCCASIONAL FEELINGS OF UNSTEADINESS: 0

## 2024-10-29 ASSESSMENT — LIFESTYLE VARIABLES
HOW MANY STANDARD DRINKS CONTAINING ALCOHOL DO YOU HAVE ON A TYPICAL DAY: 1 OR 2
HOW OFTEN DO YOU HAVE A DRINK CONTAINING ALCOHOL: MONTHLY OR LESS
SKIP TO QUESTIONS 9-10: 1
HAVE YOU OR SOMEONE ELSE BEEN INJURED AS A RESULT OF YOUR DRINKING: NO
AUDIT-C TOTAL SCORE: 1
AUDIT TOTAL SCORE: 1
HAS A RELATIVE, FRIEND, DOCTOR, OR ANOTHER HEALTH PROFESSIONAL EXPRESSED CONCERN ABOUT YOUR DRINKING OR SUGGESTED YOU CUT DOWN: NO
HOW OFTEN DO YOU HAVE SIX OR MORE DRINKS ON ONE OCCASION: NEVER

## 2024-11-25 ENCOUNTER — APPOINTMENT (OUTPATIENT)
Dept: CARDIOLOGY | Facility: CLINIC | Age: 69
End: 2024-11-25
Payer: MEDICARE

## 2024-11-25 RX ORDER — ONDANSETRON 4 MG/1
TABLET, FILM COATED ORAL
COMMUNITY
Start: 2024-09-18

## 2025-01-03 ENCOUNTER — APPOINTMENT (OUTPATIENT)
Dept: PULMONOLOGY | Facility: CLINIC | Age: 70
End: 2025-01-03
Payer: MEDICARE

## 2025-01-10 ENCOUNTER — HOSPITAL ENCOUNTER (OUTPATIENT)
Dept: RADIOLOGY | Facility: HOSPITAL | Age: 70
Discharge: HOME | End: 2025-01-10
Payer: MEDICARE

## 2025-01-10 DIAGNOSIS — R91.1 LUNG NODULE: ICD-10-CM

## 2025-01-10 DIAGNOSIS — F17.211 CIGARETTE NICOTINE DEPENDENCE IN REMISSION: ICD-10-CM

## 2025-01-10 PROCEDURE — 71250 CT THORAX DX C-: CPT

## 2025-01-24 ENCOUNTER — APPOINTMENT (OUTPATIENT)
Dept: PULMONOLOGY | Facility: CLINIC | Age: 70
End: 2025-01-24
Payer: MEDICARE

## 2025-01-24 VITALS
DIASTOLIC BLOOD PRESSURE: 85 MMHG | BODY MASS INDEX: 25.16 KG/M2 | WEIGHT: 151.2 LBS | OXYGEN SATURATION: 94 % | SYSTOLIC BLOOD PRESSURE: 131 MMHG | HEART RATE: 65 BPM

## 2025-01-24 DIAGNOSIS — R91.8 LUNG NODULES: Primary | ICD-10-CM

## 2025-01-24 DIAGNOSIS — J44.9 CHRONIC OBSTRUCTIVE PULMONARY DISEASE, UNSPECIFIED COPD TYPE (MULTI): ICD-10-CM

## 2025-01-24 DIAGNOSIS — F17.211 CIGARETTE NICOTINE DEPENDENCE IN REMISSION: ICD-10-CM

## 2025-01-24 PROCEDURE — 99215 OFFICE O/P EST HI 40 MIN: CPT | Performed by: PEDIATRICS

## 2025-01-24 PROCEDURE — 1036F TOBACCO NON-USER: CPT | Performed by: PEDIATRICS

## 2025-01-24 PROCEDURE — 1159F MED LIST DOCD IN RCRD: CPT | Performed by: PEDIATRICS

## 2025-01-24 PROCEDURE — 1160F RVW MEDS BY RX/DR IN RCRD: CPT | Performed by: PEDIATRICS

## 2025-01-24 NOTE — PROGRESS NOTES
Subjective   Patient ID: Lorena Gutierrez is a 69 y.o. female who presents for emphysema, lung nodules    HPI    1/24/2025:  Mrs Gutierrez is doing well.  She had a repeat CT that shows stable nodules but one of the nodules that is slightly more solid than previously.  She feels great and has a cruise planned for October.    Previous note 8/5/2024:  Ms Gutierrez is about the same.  She had a repeat CT which shows stability of the ground glass nodule as well as the other smaller nodules.  She has no respiratory complaints.     Initial visit 5/9/2024:  Ms Gutierrez is a 68yr olf former smoker that had a low dose screening CT which shows several small stable nodules and a new 13 x 7mm ground glass nodule in the JOSE.  She has no respiratory complaints or issues.     She smoked 1/2 - 1ppd from age [age] but not continuously, she had quit several times then would restart.     She has a significant family history of lung cancer (mother, father, siblings)       Review of Systems    See scanned documents attached to this note for review of systems, and appr     Objective   Physical Exam  Constitutional:       Appearance: Normal appearance.   HENT:      Head: Normocephalic and atraumatic.      Mouth/Throat:      Pharynx: Oropharynx is clear.   Cardiovascular:      Rate and Rhythm: Normal rate and regular rhythm.      Pulses: Normal pulses.      Heart sounds: Normal heart sounds.   Pulmonary:      Effort: Pulmonary effort is normal.      Breath sounds: Normal breath sounds. No wheezing, rhonchi or rales.   Abdominal:      General: Bowel sounds are normal.      Palpations: Abdomen is soft.   Musculoskeletal:         General: Normal range of motion.   Skin:     General: Skin is warm and dry.   Neurological:      General: No focal deficit present.      Mental Status: She is alert and oriented to person, place, and time.   Psychiatric:         Mood and Affect: Mood normal.         Assessment/Plan       69yr old with new ground  glass nodule found on screening CT     Lung nodule:  ground glass, looks inflammatory.  She was not ill at the time of the scan.  And she does have a smoking history as well as significant family history of lung cancer (in smokers). Repeat CT unchanged  - will get L3/L4 in 6 months (January)  1/24/2024:  slightly more solid nodule (no change in size)  will get L3/L4 CT July 10 or later     2. Emphysema:  mild emphysema seen o CT.    No symptoms, no treatment at this time  -Could consider LAMA/LABA if symptoms develop     3. Tobacco use: quit 2013, significant history of lung cancer.  42yrs 1/2 - 1ppd (about 30pack years)  Qualifies for LDCT screening program.     Follow up after CT July       Remi Hassan MD 01/24/25 8:02 AM

## 2025-04-10 ENCOUNTER — HOSPITAL ENCOUNTER (OUTPATIENT)
Dept: RADIOLOGY | Facility: HOSPITAL | Age: 70
Discharge: HOME | End: 2025-04-10
Payer: MEDICARE

## 2025-04-10 VITALS — BODY MASS INDEX: 24.16 KG/M2 | WEIGHT: 145 LBS | HEIGHT: 65 IN

## 2025-04-10 DIAGNOSIS — Z12.31 ENCOUNTER FOR SCREENING MAMMOGRAM FOR MALIGNANT NEOPLASM OF BREAST: ICD-10-CM

## 2025-04-10 PROCEDURE — 77067 SCR MAMMO BI INCL CAD: CPT | Performed by: RADIOLOGY

## 2025-04-10 PROCEDURE — 77063 BREAST TOMOSYNTHESIS BI: CPT | Performed by: RADIOLOGY

## 2025-04-10 PROCEDURE — 77063 BREAST TOMOSYNTHESIS BI: CPT

## 2025-07-10 ENCOUNTER — APPOINTMENT (OUTPATIENT)
Dept: RADIOLOGY | Facility: HOSPITAL | Age: 70
End: 2025-07-10
Payer: MEDICARE

## 2025-07-15 ENCOUNTER — TELEPHONE (OUTPATIENT)
Dept: RADIOLOGY | Facility: HOSPITAL | Age: 70
End: 2025-07-15
Payer: MEDICARE

## 2025-07-22 ENCOUNTER — HOSPITAL ENCOUNTER (OUTPATIENT)
Dept: RADIOLOGY | Facility: HOSPITAL | Age: 70
Discharge: HOME | End: 2025-07-22
Payer: MEDICARE

## 2025-07-22 DIAGNOSIS — F17.211 CIGARETTE NICOTINE DEPENDENCE IN REMISSION: ICD-10-CM

## 2025-07-22 DIAGNOSIS — R91.8 LUNG NODULES: ICD-10-CM

## 2025-07-22 PROCEDURE — 71250 CT THORAX DX C-: CPT

## 2025-07-22 PROCEDURE — 76380 CAT SCAN FOLLOW-UP STUDY: CPT | Performed by: RADIOLOGY

## 2025-07-25 ENCOUNTER — APPOINTMENT (OUTPATIENT)
Dept: PULMONOLOGY | Facility: CLINIC | Age: 70
End: 2025-07-25
Payer: MEDICARE

## 2025-09-08 ENCOUNTER — APPOINTMENT (OUTPATIENT)
Dept: PULMONOLOGY | Facility: CLINIC | Age: 70
End: 2025-09-08
Payer: MEDICARE

## 2025-10-28 ENCOUNTER — APPOINTMENT (OUTPATIENT)
Dept: CARDIOLOGY | Facility: CLINIC | Age: 70
End: 2025-10-28
Payer: MEDICARE